# Patient Record
Sex: MALE | Race: OTHER | Employment: FULL TIME
[De-identification: names, ages, dates, MRNs, and addresses within clinical notes are randomized per-mention and may not be internally consistent; named-entity substitution may affect disease eponyms.]

---

## 2023-12-07 ENCOUNTER — HOSPITAL ENCOUNTER (EMERGENCY)
Facility: HOSPITAL | Age: 38
Discharge: HOME | End: 2023-12-07
Attending: EMERGENCY MEDICINE

## 2023-12-07 ENCOUNTER — APPOINTMENT (OUTPATIENT)
Facility: HOSPITAL | Age: 38
End: 2023-12-07

## 2023-12-07 VITALS
SYSTOLIC BLOOD PRESSURE: 136 MMHG | DIASTOLIC BLOOD PRESSURE: 93 MMHG | OXYGEN SATURATION: 99 % | RESPIRATION RATE: 20 BRPM | WEIGHT: 110 LBS | TEMPERATURE: 97.3 F | HEART RATE: 99 BPM

## 2023-12-07 DIAGNOSIS — S62.664B OPEN NONDISPLACED FRACTURE OF DISTAL PHALANX OF RIGHT RING FINGER, INITIAL ENCOUNTER: Primary | ICD-10-CM

## 2023-12-07 DIAGNOSIS — S62.634B OPEN DISPLACED FRACTURE OF DISTAL PHALANX OF RIGHT RING FINGER, INITIAL ENCOUNTER: ICD-10-CM

## 2023-12-07 PROCEDURE — 6360000002 HC RX W HCPCS: Performed by: HEALTH CARE PROVIDER

## 2023-12-07 PROCEDURE — 73130 X-RAY EXAM OF HAND: CPT

## 2023-12-07 PROCEDURE — 12001 RPR S/N/AX/GEN/TRNK 2.5CM/<: CPT

## 2023-12-07 PROCEDURE — 90715 TDAP VACCINE 7 YRS/> IM: CPT | Performed by: HEALTH CARE PROVIDER

## 2023-12-07 PROCEDURE — 90471 IMMUNIZATION ADMIN: CPT | Performed by: HEALTH CARE PROVIDER

## 2023-12-07 PROCEDURE — 2580000003 HC RX 258: Performed by: HEALTH CARE PROVIDER

## 2023-12-07 PROCEDURE — 99284 EMERGENCY DEPT VISIT MOD MDM: CPT

## 2023-12-07 PROCEDURE — 96374 THER/PROPH/DIAG INJ IV PUSH: CPT

## 2023-12-07 RX ORDER — 0.9 % SODIUM CHLORIDE 0.9 %
1000 INTRAVENOUS SOLUTION INTRAVENOUS ONCE
Status: COMPLETED | OUTPATIENT
Start: 2023-12-07 | End: 2023-12-07

## 2023-12-07 RX ORDER — AMOXICILLIN AND CLAVULANATE POTASSIUM 875; 125 MG/1; MG/1
1 TABLET, FILM COATED ORAL 2 TIMES DAILY
Qty: 10 TABLET | Refills: 0 | Status: SHIPPED | OUTPATIENT
Start: 2023-12-07 | End: 2023-12-12

## 2023-12-07 RX ORDER — ONDANSETRON 2 MG/ML
4 INJECTION INTRAMUSCULAR; INTRAVENOUS
Status: DISCONTINUED | OUTPATIENT
Start: 2023-12-07 | End: 2023-12-07 | Stop reason: HOSPADM

## 2023-12-07 RX ORDER — OXYCODONE HYDROCHLORIDE 5 MG/1
5 TABLET ORAL EVERY 6 HOURS PRN
Qty: 12 TABLET | Refills: 0 | Status: SHIPPED | OUTPATIENT
Start: 2023-12-07 | End: 2023-12-10

## 2023-12-07 RX ORDER — MORPHINE SULFATE 4 MG/ML
4 INJECTION, SOLUTION INTRAMUSCULAR; INTRAVENOUS
Status: COMPLETED | OUTPATIENT
Start: 2023-12-07 | End: 2023-12-07

## 2023-12-07 RX ADMIN — CEFAZOLIN 2000 MG: 1 INJECTION, POWDER, FOR SOLUTION INTRAMUSCULAR; INTRAVENOUS at 15:09

## 2023-12-07 RX ADMIN — MORPHINE SULFATE 4 MG: 4 INJECTION, SOLUTION INTRAMUSCULAR; INTRAVENOUS at 14:19

## 2023-12-07 RX ADMIN — SODIUM CHLORIDE 1000 ML: 9 INJECTION, SOLUTION INTRAVENOUS at 15:12

## 2023-12-07 RX ADMIN — TETANUS TOXOID, REDUCED DIPHTHERIA TOXOID AND ACELLULAR PERTUSSIS VACCINE, ADSORBED 0.5 ML: 5; 2.5; 8; 8; 2.5 SUSPENSION INTRAMUSCULAR at 15:04

## 2023-12-07 ASSESSMENT — PAIN SCALES - GENERAL: PAINLEVEL_OUTOF10: 8

## 2023-12-07 ASSESSMENT — ENCOUNTER SYMPTOMS
DIARRHEA: 0
VOMITING: 0
SHORTNESS OF BREATH: 0

## 2023-12-07 NOTE — ED NOTES
Quick clot gauze was applied. After 15 minutes bleeding was still present. Another 15 minutes and bleeding resolved. Wound was then dressed with nonadherent impregnated gauze, bulky gauze dressing and finger splint was applied.       Mecca Miller RN  12/07/23 [id number]

## 2023-12-07 NOTE — ED NOTES
Wound irrigated with betadine diluted in normal saline. Gauze dressing applied.      Akhil Najera RN  12/07/23 [id number]

## 2023-12-07 NOTE — ED PROVIDER NOTES
EMERGENCY DEPARTMENT HISTORY AND PHYSICAL EXAM        Date: 12/7/2023  Patient Name: Uzma Burnett    History of Presenting Illness     Chief Complaint   Patient presents with    Laceration       History Provided By: History obtained from patient    HPI: Uzma Burnett, 34 y.o. male presents to the ED with cc of laceration right fourth digit    Encounter was conducted with aid of video , patient is Croatian-speaking    Patient was working with [employer] equipment this morning with some type of motorized cutting device and his right fourth digit was cut. The applied gauze wrap with some hemorrhage control and came to the emergency department right away. Patient states he has no drug allergies no significant medical history. Last tetanus unknown. The fingertip was not cut off. No nausea, vomiting, diarrhea, fever, chills, chest pain, shortness of breath, leg swelling     There are no other complaints, changes, or physical findings at this time. Records Reviewed: none available    PCP: No primary care provider on file. No current facility-administered medications on file prior to encounter. No current outpatient medications on file prior to encounter. Past History     Past Medical History:  No past medical history on file. Past Surgical History:  No past surgical history on file. Family History:  No family history on file. Social History: Allergies:  No Known Allergies      Review of Systems   Review of Systems   Constitutional:  Negative for activity change. See HPI for pertinent positives and negatives   Respiratory:  Negative for shortness of breath. Cardiovascular:  Negative for chest pain and leg swelling. Gastrointestinal:  Negative for diarrhea and vomiting. Skin:  Positive for wound. Physical Exam   Physical Exam  Vitals and nursing note reviewed. Constitutional:       General: He is not in acute distress.      Appearance:

## 2023-12-08 NOTE — ED NOTES
Discharge instructions reviewed with patient. Patient verbalized understanding. Patient advised to follow up as directed on discharge instructions. Patient denies questions, needs or concerns at this time. Patient verbalized understanding. No s/sx of distress noted. Tele interpretor service used.      Jen Rosen RN  12/07/23 [id number]

## 2023-12-12 ENCOUNTER — OFFICE VISIT (OUTPATIENT)
Age: 38
End: 2023-12-12

## 2023-12-12 VITALS — TEMPERATURE: 98 F | HEIGHT: 62 IN | BODY MASS INDEX: 21.35 KG/M2 | WEIGHT: 116 LBS

## 2023-12-12 DIAGNOSIS — S69.91XA INJURY OF RIGHT RING FINGER, INITIAL ENCOUNTER: Primary | ICD-10-CM

## 2023-12-12 PROCEDURE — 99203 OFFICE O/P NEW LOW 30 MIN: CPT | Performed by: ORTHOPAEDIC SURGERY

## 2023-12-12 NOTE — PROGRESS NOTES
laceration at the distal phalanx with some discoloration in the skin but no drainage noted good capillary refill tender to palpation  IMAGING: XR of the right hand with 3 views obtained in office dated 12/07/2023 reviewed and read by Dr. Mino Palomares: Posttraumatic changes of the distal ring finger as described. Comminuted, predominantly transverse fracture deformity of the mid diaphysis of  the terminal phalanx of the ring finger with up to 0.5 cm displacement of the fracture fragments. Associated post traumatic laceration of the soft tissues  with soft tissue swelling. No radiopaque foreign object identified. X-rays obtained shows a fracture through the tip of the distal phalanx with slight separation  IMPRESSION:      ICD-10-CM    1. Injury of right ring finger, initial encounter  S69.91XA            PLAN:   1. Pt presented today with right ring finger pain secondary to injury. Start local wound care with a splint . orthopaedics, for evaluation and treatment. I also cleaned the area of the wound in office today with saline and peroxide. Risk factors include: N/A  2. No cortisone injection indicated today  3. No Physical/Occupational Therapy indicated today  4. No diagnostic test indicated today   5. No durable medical equipment indicated today  6. Yes referral indicated today Dr. Desiree Stephenson  7. No medications indicated today:   8. No Narcotic indicated today. RTC PRN       Scribed by Mark Eisenmenger ([address] [place] [address]) as dictated by Moisés Beal MD    I, Dr. Moisés Beal, confirm that all documentation is accurate.     Moisés Beal M.D.   [facility]

## 2023-12-14 ENCOUNTER — OFFICE VISIT (OUTPATIENT)
Age: 38
End: 2023-12-14

## 2023-12-14 DIAGNOSIS — M79.644 PAIN OF FINGER OF RIGHT HAND: ICD-10-CM

## 2023-12-14 DIAGNOSIS — S69.91XA INJURY OF RIGHT RING FINGER, INITIAL ENCOUNTER: Primary | ICD-10-CM

## 2023-12-14 PROCEDURE — 99213 OFFICE O/P EST LOW 20 MIN: CPT | Performed by: ORTHOPAEDIC SURGERY

## 2023-12-14 PROCEDURE — 73140 X-RAY EXAM OF FINGER(S): CPT | Performed by: ORTHOPAEDIC SURGERY

## 2023-12-14 NOTE — PROGRESS NOTES
predominantly transverse fracture deformity of the mid diaphysis of  the terminal phalanx of the ring finger with up to 0.5 cm displacement of the fracture fragments. Associated post traumatic laceration of the soft tissues  with soft tissue swelling. No radiopaque foreign object identified. X-rays obtained shows a fracture through the tip of the distal phalanx with slight separation    IMPRESSION:      ICD-10-CM    1. Injury of right ring finger, initial encounter  S69.91XA       2. Pain of finger of right hand  M79.644 [82021] Fingers Min 2V           PLAN:   1. This time we will continue local wound care. The fracture is displaced but hesitant to put any hardware in the distal phalanx given the open nature of the fracture  Risk factors include: N/A  2. No cortisone injection indicated today   3. No Physical/Occupational Therapy indicated today  4. No diagnostic test indicated today:   5. No durable medical equipment indicated today  6. No referral indicated today   7. No medications indicated today:   8. No Narcotic indicated today. RTC 5 days      Scribed by Eliane Shin [facility]) as dictated by Thiago Miramontes MD    I, Dr. Thiago Miramontes, confirm that all documentation is accurate.     Thiago Miramontes M.D.   [facility]

## 2024-01-08 ENCOUNTER — OFFICE VISIT (OUTPATIENT)
Age: 39
End: 2024-01-08

## 2024-01-08 VITALS — WEIGHT: 116 LBS | HEIGHT: 62 IN | BODY MASS INDEX: 21.35 KG/M2

## 2024-01-08 DIAGNOSIS — M79.644 PAIN OF FINGER OF RIGHT HAND: ICD-10-CM

## 2024-01-08 DIAGNOSIS — S69.91XA INJURY OF RIGHT RING FINGER, INITIAL ENCOUNTER: Primary | ICD-10-CM

## 2024-01-08 PROCEDURE — 99213 OFFICE O/P EST LOW 20 MIN: CPT | Performed by: ORTHOPAEDIC SURGERY

## 2024-01-08 NOTE — PROGRESS NOTES
Asa Jimenez  1985   Chief Complaint   Patient presents with    Hand Pain     RT         HISTORY OF PRESENT ILLNESS  Asa Jimenez is a 38 y.o. male who presents today for reevaluation of right ring finger injury. Pain is a 3/10. Pain has been present since injury. Pt presented to the ED on 12/7/2023 c/o laceration of right fourth digit.  She has presents today for follow-up from the injury apparently was done with a soft type of device the day of injury while at work.    Patient denies any fever, chills, chest pain, shortness of breath or calf pain. The remainder of the review of systems is negative. There are no new illness or injuries to report since last seen in the office. No changes in medications, allergies, social or family history.      PHYSICAL EXAM:   Ht 1.575 m (5' 2\")   Wt 52.6 kg (116 lb)   BMI 21.22 kg/m²   The patient is a well-developed, well-nourished male   in no acute distress.  The patient is alert and oriented times three.  The patient is alert and oriented times three. Mood and affect are normal.  LYMPHATIC: lymph nodes are not enlarged and are within normal limits  SKIN: normal in color and non tender to palpation. There are no bruises or abrasions noted.   NEUROLOGICAL: Motor sensory exam is within normal limits. Reflexes are equal bilaterally. There is normal sensation to pinprick and light touch  MUSCULOSKELETAL:  On inspection there is a laceration at the distal phalanx with some discoloration in the skin but no drainage noted good capillary refill tender to palpation.  The wound is clean and dry        IMAGING: XR of the right ring finger with 2 views obtained in office dated 12/14/2023 reviewed and read by Dr. Freeman: Displaced distal phalanx fracture with the distal end of the fracture slightly angulated and displaced     XR of the right hand with 3 views obtained in office dated 12/07/2023 reviewed and read by Dr. Freeman: Posttraumatic changes of the distal